# Patient Record
Sex: FEMALE | Race: WHITE | URBAN - METROPOLITAN AREA
[De-identification: names, ages, dates, MRNs, and addresses within clinical notes are randomized per-mention and may not be internally consistent; named-entity substitution may affect disease eponyms.]

---

## 2022-11-02 ENCOUNTER — WEB ENCOUNTER (OUTPATIENT)
Dept: URBAN - METROPOLITAN AREA CLINIC 115 | Facility: CLINIC | Age: 42
End: 2022-11-02

## 2022-11-08 ENCOUNTER — OFFICE VISIT (OUTPATIENT)
Dept: URBAN - METROPOLITAN AREA CLINIC 115 | Facility: CLINIC | Age: 42
End: 2022-11-08
Payer: SELF-PAY

## 2022-11-08 ENCOUNTER — LAB OUTSIDE AN ENCOUNTER (OUTPATIENT)
Dept: URBAN - METROPOLITAN AREA CLINIC 115 | Facility: CLINIC | Age: 42
End: 2022-11-08

## 2022-11-08 VITALS
TEMPERATURE: 98.3 F | DIASTOLIC BLOOD PRESSURE: 86 MMHG | RESPIRATION RATE: 17 BRPM | HEIGHT: 62 IN | WEIGHT: 193.6 LBS | BODY MASS INDEX: 35.63 KG/M2 | SYSTOLIC BLOOD PRESSURE: 125 MMHG | HEART RATE: 80 BPM

## 2022-11-08 DIAGNOSIS — D64.89 NORMOCYTIC ANEMIA: ICD-10-CM

## 2022-11-08 DIAGNOSIS — R10.33 PERIUMBILICAL ABDOMINAL PAIN: ICD-10-CM

## 2022-11-08 DIAGNOSIS — K62.5 RECTAL BLEEDING: ICD-10-CM

## 2022-11-08 DIAGNOSIS — K59.04 CHRONIC IDIOPATHIC CONSTIPATION: ICD-10-CM

## 2022-11-08 PROBLEM — 443371000124107: Status: ACTIVE | Noted: 2022-11-08

## 2022-11-08 PROBLEM — 300980002: Status: ACTIVE | Noted: 2022-11-08

## 2022-11-08 PROCEDURE — 46611 ANOSCOPY: CPT | Performed by: INTERNAL MEDICINE

## 2022-11-08 PROCEDURE — 99203 OFFICE O/P NEW LOW 30 MIN: CPT | Performed by: INTERNAL MEDICINE

## 2022-11-08 NOTE — HPI-TODAY'S VISIT:
43 y/o  female with obesity that came for eval due to chronic constipation,rectal bleeding and abdominal pain with reflux,labs shows mild normocytic anemia. Denies weight loss,black stools.No family hx of gastric or colon cancer. Refer prior hx of PUD a few years ago diagnosed by [facility] at [place] ,abx's were prescribed but she never took them. No smoke.

## 2022-11-18 ENCOUNTER — CLAIMS CREATED FROM THE CLAIM WINDOW (OUTPATIENT)
Dept: URBAN - METROPOLITAN AREA SURGERY CENTER 13 | Facility: SURGERY CENTER | Age: 42
End: 2022-11-18
Payer: SELF-PAY

## 2022-11-18 ENCOUNTER — OFFICE VISIT (OUTPATIENT)
Dept: URBAN - METROPOLITAN AREA SURGERY CENTER 13 | Facility: SURGERY CENTER | Age: 42
End: 2022-11-18

## 2022-11-18 ENCOUNTER — CLAIMS CREATED FROM THE CLAIM WINDOW (OUTPATIENT)
Dept: URBAN - METROPOLITAN AREA CLINIC 4 | Facility: CLINIC | Age: 42
End: 2022-11-18
Payer: SELF-PAY

## 2022-11-18 ENCOUNTER — TELEPHONE ENCOUNTER (OUTPATIENT)
Dept: URBAN - METROPOLITAN AREA CLINIC 115 | Facility: CLINIC | Age: 42
End: 2022-11-18

## 2022-11-18 DIAGNOSIS — K31.89 ACQUIRED DEFORMITY OF DUODENUM: ICD-10-CM

## 2022-11-18 DIAGNOSIS — K51.40 INFLAMMATORY POLYP OF COLON: ICD-10-CM

## 2022-11-18 DIAGNOSIS — R10.33 ABDOMINAL PAIN, ACUTE, PERIUMBILICAL: ICD-10-CM

## 2022-11-18 DIAGNOSIS — K62.5 ANAL BLEEDING: ICD-10-CM

## 2022-11-18 DIAGNOSIS — K59.04 CHRONIC IDIOPATHIC CONSTIPATION: ICD-10-CM

## 2022-11-18 DIAGNOSIS — K31.89 OTHER DISEASES OF STOMACH AND DUODENUM: ICD-10-CM

## 2022-11-18 DIAGNOSIS — D50.9 ANEMIA: ICD-10-CM

## 2022-11-18 PROCEDURE — G8907 PT DOC NO EVENTS ON DISCHARG: HCPCS | Performed by: INTERNAL MEDICINE

## 2022-11-18 PROCEDURE — 88305 TISSUE EXAM BY PATHOLOGIST: CPT | Performed by: PATHOLOGY

## 2022-11-18 PROCEDURE — 45380 COLONOSCOPY AND BIOPSY: CPT | Performed by: INTERNAL MEDICINE

## 2022-11-18 PROCEDURE — 43239 EGD BIOPSY SINGLE/MULTIPLE: CPT | Performed by: INTERNAL MEDICINE

## 2022-11-28 PROBLEM — 12063002: Status: ACTIVE | Noted: 2022-11-08

## 2022-11-28 PROBLEM — 443503005: Status: ACTIVE | Noted: 2022-11-08

## 2023-01-03 ENCOUNTER — OFFICE VISIT (OUTPATIENT)
Dept: URBAN - METROPOLITAN AREA CLINIC 115 | Facility: CLINIC | Age: 43
End: 2023-01-03

## 2023-02-14 ENCOUNTER — WEB ENCOUNTER (OUTPATIENT)
Dept: URBAN - METROPOLITAN AREA CLINIC 115 | Facility: CLINIC | Age: 43
End: 2023-02-14

## 2023-02-14 ENCOUNTER — OFFICE VISIT (OUTPATIENT)
Dept: URBAN - METROPOLITAN AREA CLINIC 115 | Facility: CLINIC | Age: 43
End: 2023-02-14
Payer: SELF-PAY

## 2023-02-14 ENCOUNTER — DASHBOARD ENCOUNTERS (OUTPATIENT)
Age: 43
End: 2023-02-14

## 2023-02-14 VITALS
HEIGHT: 62 IN | HEART RATE: 71 BPM | DIASTOLIC BLOOD PRESSURE: 65 MMHG | TEMPERATURE: 98.3 F | BODY MASS INDEX: 36.1 KG/M2 | RESPIRATION RATE: 16 BRPM | SYSTOLIC BLOOD PRESSURE: 114 MMHG | WEIGHT: 196.2 LBS

## 2023-02-14 DIAGNOSIS — K64.4 EXTERNAL HEMORRHOIDS: ICD-10-CM

## 2023-02-14 DIAGNOSIS — K64.8 INTERNAL HEMORRHOIDS: ICD-10-CM

## 2023-02-14 DIAGNOSIS — K59.04 CHRONIC IDIOPATHIC CONSTIPATION: ICD-10-CM

## 2023-02-14 DIAGNOSIS — K62.89 HYPERTROPHIED ANAL PAPILLA: ICD-10-CM

## 2023-02-14 PROBLEM — 82934008: Status: ACTIVE | Noted: 2022-11-08

## 2023-02-14 PROBLEM — 85316008: Status: ACTIVE | Noted: 2023-02-14

## 2023-02-14 PROBLEM — 23913003: Status: ACTIVE | Noted: 2023-02-14

## 2023-02-14 PROBLEM — 90458007: Status: ACTIVE | Noted: 2023-02-14

## 2023-02-14 PROCEDURE — 99212 OFFICE O/P EST SF 10 MIN: CPT | Performed by: INTERNAL MEDICINE

## 2023-02-14 NOTE — HPI-TODAY'S VISIT:
43 y/o female came for f/up after EGD and colonoscopy.EGD without H pylori or celiac changes.Colonoscopy with hyperthopied anal papilla and small hemorrhoids.She denies signifcant constipation, bleeding or pain lately.